# Patient Record
Sex: FEMALE | Race: WHITE | NOT HISPANIC OR LATINO | URBAN - METROPOLITAN AREA
[De-identification: names, ages, dates, MRNs, and addresses within clinical notes are randomized per-mention and may not be internally consistent; named-entity substitution may affect disease eponyms.]

---

## 2018-08-06 ENCOUNTER — EMERGENCY (EMERGENCY)
Facility: HOSPITAL | Age: 38
LOS: 1 days | Discharge: HOME | End: 2018-08-06
Attending: EMERGENCY MEDICINE
Payer: COMMERCIAL

## 2018-08-06 VITALS
HEART RATE: 80 BPM | OXYGEN SATURATION: 98 % | DIASTOLIC BLOOD PRESSURE: 79 MMHG | HEIGHT: 64 IN | TEMPERATURE: 99 F | WEIGHT: 130.07 LBS | SYSTOLIC BLOOD PRESSURE: 127 MMHG | RESPIRATION RATE: 16 BRPM

## 2018-08-06 VITALS
OXYGEN SATURATION: 98 % | DIASTOLIC BLOOD PRESSURE: 74 MMHG | HEART RATE: 80 BPM | SYSTOLIC BLOOD PRESSURE: 103 MMHG | RESPIRATION RATE: 18 BRPM | TEMPERATURE: 98 F

## 2018-08-06 PROCEDURE — 99283 EMERGENCY DEPT VISIT LOW MDM: CPT | Mod: 25

## 2018-08-06 PROCEDURE — 99282 EMERGENCY DEPT VISIT SF MDM: CPT

## 2018-08-06 PROCEDURE — 99053 MED SERV 10PM-8AM 24 HR FAC: CPT

## 2018-08-06 RX ORDER — DIPHENHYDRAMINE HCL 50 MG
25 CAPSULE ORAL ONCE
Qty: 0 | Refills: 0 | Status: COMPLETED | OUTPATIENT
Start: 2018-08-06 | End: 2018-08-06

## 2018-08-06 RX ADMIN — Medication 25 MILLIGRAM(S): at 07:44

## 2018-08-06 NOTE — ED PROVIDER NOTE - MEDICAL DECISION MAKING DETAILS
36yo M->F transgender pt with hx anxiety presents after a panic attack. In ED able to nap. Given benadryl, will dc to f/u with outpatient psychiatry.

## 2018-08-06 NOTE — ED PROVIDER NOTE - OBJECTIVE STATEMENT
36yo M->F transgender pt with hx anxiety presents after a panic attack. reports last night unable to sleep, having persistent symptoms of intrusive thoughts-thinking she has cancer or multiple sclerosis though she knows it is not true. Reports she was filled with panic and unable to fall asleep, feeling tingling all over her body. This AM she felt very upset and mother decided to bring her to ED for evaluation. Denies SI/HI, hallucinations. Reports she stopped staking her estrogen replacement medications 2 weeks ago and has noted her anxiety has worsened since then. Reports she sees a psychiatrist, currently on lexapro 10mg daily for anxiety, next appointment in 4 days. Currently feels better and more calm.

## 2018-08-06 NOTE — ED ADULT NURSE NOTE - NSIMPLEMENTINTERV_GEN_ALL_ED
Implemented All Universal Safety Interventions:  [place] to call system. Call bell, personal items and telephone within reach. Instruct patient to call for assistance. Room bathroom lighting operational. Non-slip footwear when patient is off stretcher. Physically safe environment: no spills, clutter or unnecessary equipment. Stretcher in lowest position, wheels locked, appropriate side rails in place.

## 2020-10-11 ENCOUNTER — EMERGENCY (EMERGENCY)
Facility: HOSPITAL | Age: 40
LOS: 1 days | Discharge: HOME | End: 2020-10-11
Attending: EMERGENCY MEDICINE
Payer: COMMERCIAL

## 2020-10-11 VITALS
RESPIRATION RATE: 16 BRPM | DIASTOLIC BLOOD PRESSURE: 69 MMHG | TEMPERATURE: 98 F | SYSTOLIC BLOOD PRESSURE: 106 MMHG | HEIGHT: 64 IN | HEART RATE: 70 BPM | OXYGEN SATURATION: 97 % | WEIGHT: 128.09 LBS

## 2020-10-11 PROCEDURE — 99283 EMERGENCY DEPT VISIT LOW MDM: CPT

## 2020-10-11 RX ORDER — LIDOCAINE 4 G/100G
1 CREAM TOPICAL
Qty: 1 | Refills: 0
Start: 2020-10-11

## 2020-10-11 RX ORDER — IBUPROFEN 200 MG
1 TABLET ORAL
Qty: 30 | Refills: 0
Start: 2020-10-11

## 2020-10-11 RX ORDER — LIDOCAINE 4 G/100G
1 CREAM TOPICAL ONCE
Refills: 0 | Status: DISCONTINUED | OUTPATIENT
Start: 2020-10-11 | End: 2020-10-15

## 2020-10-11 NOTE — ED PROVIDER NOTE - RECTAL
no external hemorrhoids, no erythema, internal: mild anterior rectal wall tenderness, no fluctuance, no swelling, no erythema, no bleeding, no visible anal fissure/TENDER

## 2020-10-11 NOTE — ED PROVIDER NOTE - PATIENT PORTAL LINK FT
You can access the FollowMyHealth Patient Portal offered by [facility] by registering at the following website: [email or web address] By joining [facility]’s FollowMyHealth portal, you will also be able to view your health information using other applications (apps) compatible with our system.

## 2020-10-11 NOTE — ED ADULT NURSE NOTE - OBJECTIVE STATEMENT
pt is here c/o rectal pain , for the last 4 days , pain on defecating , is concerned for possible object being - prefers to be addressed as a female

## 2020-10-11 NOTE — ED PROVIDER NOTE - OBJECTIVE STATEMENT
38 y/o transgender M to F presents to the ED with complaints of anal pain x 4 days. Patient started having anal receptive intercourse 1 week ago for the first time ever and had her partner using dildos before telling him to stop. 4 days ago patient had anal receptive intercourse with another partner with no issues; penis and condom used. Patient then reports having anal pain for the past 4 days. Patient notes passing normal bowel movements, brown and soft, however noticed blood on toilet paper while wiping and became concerned. Patient went to urgent care today and had an X-ray to look for foreign bodies and a rectal exam. They thought they felt a hemorrhoid on the rectal exam and sent her to the ER for evaluation. Denies fever, chills, abdominal pain or any other acute complaints. 38 y/o transgender M to F presents to the ED with complaints of anal pain x 4 days. Patient started having anal receptive intercourse 1 week ago for the first time ever and had her partner using dildos before telling him to stop. 4 days ago patient had anal receptive intercourse again (no toys) with another partner with no issues; penis and condom used. Patient then reports having anal pain for the past 4 days. Patient notes passing normal bowel movements, brown and soft, however noticed blood on toilet paper while wiping and became concerned. Patient went to urgent care today and had an X-ray to look for foreign bodies and a rectal exam. They thought they felt a hemorrhoid on the rectal exam and sent her to the ER for evaluation. Denies fever, chills, abdominal pain or any other acute complaints.

## 2020-10-11 NOTE — ED PROVIDER NOTE - NSFOLLOWUPINSTRUCTIONS_ED_ALL_ED_FT
Proctalgia Fugax       Proctalgia fugax is a condition that involves short episodes of intense pain in the rectum. The rectum is the last part of the large intestine. The pain can last from seconds to minutes. Episodes often occur during the night and wake the person from sleep. This condition is not a sign of cancer, but your health care provider may want to rule out other conditions.      What are the causes?    The exact cause of this condition is not known. One possible cause may be spasm of the pelvic muscles or spasms of the lowest part of the large intestine.      What are the signs or symptoms?  The only symptom of this condition is rectal pain. The pain may:  •Be intense or severe.      •Last for only a few seconds, or it may last up to 30 minutes.      •Occur at night and wake you up from sleep.        How is this diagnosed?  This condition may be diagnosed based on your medical history, a physical exam, and by ruling out other problems that could cause the pain. You may have various tests, such as:  •Anoscopy. In this test, a lighted scope is put into the rectum to look for abnormalities.      •Barium enema. In this test, X-rays are taken after a white, chalky substance called barium is put into the colon. The barium shows up well on the X-rays and makes it easier to see any problems.      •Blood tests to rule out infections or other problems.        How is this treated?  There is no specific treatment for this condition. This condition may be managed with:  •Medicines.      •Warm baths.      •Relaxation techniques, such as deep breathing exercises or gentle yoga.      •Gentle massage of the painful area.      •Biofeedback.        Follow these instructions at home:     •Take over-the-counter and prescription medicines only as told by your health care provider.      •Follow instructions from your health care provider about eating or drinking restrictions.      •Ask your health care provider what activities are safe for you.      •Try warm baths, massaging the area, or progressive relaxation techniques as told by your health care provider.      •Keep all follow-up visits as told by your health care provider. This is important.        Contact a health care provider if:    •Your pain gets worse.      •You develop new symptoms.      •You have anal pain along with a fever.        Get help right away if you:    •Have blood in your stool or blood coming from the rectal area.        Summary    •Proctalgia fugax is a condition that involves short episodes of intense pain in the rectum. Episodes often occur during the night and wake the person from sleep.      •The cause of this condition is not known.      •Medicines, warm baths, massage, relaxation techniques, and biofeedback may help to manage the pain.      •Get help right away if you have blood in your stool or blood coming from the rectal area.      This information is not intended to replace advice given to you by your health care provider. Make sure you discuss any questions you have with your health care provider.

## 2020-10-11 NOTE — ED PROVIDER NOTE - CLINICAL SUMMARY MEDICAL DECISION MAKING FREE TEXT BOX
40 y/o F presents with anal pain after first time of anal intercourse. X-ray from urgent care unremarkable. Prescribed lidocaine and ibuprofen. Explained to patient that pain is likely associated with anal penetration and is not unusual to have pain. Instructed patient to initiate anal intercourse in a gradual manner and advised her to use a flared base when using anal toys to avoid having them get sucked up. 38 y/o F presents with anal pain after initiating practice first time of anal intercourse. X-ray from urgent care unremarkable. Prescribed lidocaine and ibuprofen. Explained to patient that pain is likely associated with anal penetration and is not unusual to have pain. Instructed patient to initiate anal intercourse in a gradual manner and advised her to use a flared base when using anal toys to avoid having them get sucked up.

## 2022-10-02 NOTE — ED PROVIDER NOTE - TEMPLATE
SLP Contact Note    Chart reviewed for evaluation. Noted patient remains intubated, no command following. Therefore patient not appropriate for SLP evaluation. Will hold and follow up as appropriate.     Thank you,   Sony Iyer M.S. [facility] Patricia Pathologist Abdominal Pain, N/V/D

## 2024-02-13 NOTE — ED ADULT TRIAGE NOTE - BSA (M2)
General New:    History of Present Illness:      Ms. Nohemi Rosas is a 44year old female who presents today s/p  excision of forehead and cheek lesions 2023. Last seen 2023. Had a spitting Vicryl suture that was removed. Has not had any further sutures come out. No pain or drainage. Applying scar gel. REVIEW OF SYSTEMS:   Per HPI, remaining 10 point review of systems negative    Past Medical History:  Past Medical History:   Diagnosis Date    Mild dysplasia of cervix (ZACHARY I)     Psoriasis         Past Surgical History:   Past Surgical History:   Procedure Laterality Date    Conization cervix,loop electrd      Mole removal  2023    benign-removed form forhead       ALLERGIES:  No Known Allergies     Medications:   Current Outpatient Medications   Medication Sig Dispense Refill    betamethasone dipropionate augmented (DIPROLENE AF) 0.05 % cream Apply 2 times daily for 2 weeks; stop for 2 weeks and repeat 2 weeks on/2 weeks off as needed 30 g 11    aDAlimumab (Humira Pen) 40 MG/0.8ML pen-injector kit Inject the contents of 1 pen under the skin every other week 6 each 11    Vitamin D, Ergocalciferol, 2000 units Cap Take 2,000 Units by mouth daily. Anything but tablet, chew/capsule/drop 30 capsule 12     No current facility-administered medications for this visit.         Social History:   Social History     Socioeconomic History    Marital status: /Civil Union     Spouse name: Malini Junior    Number of children: 2    Years of education: 15    Highest education level: Associate degree: academic program   Occupational History    Occupation: homemaker   Tobacco Use    Smoking status: Former     Current packs/day: 0.00     Types: Cigarettes     Quit date:      Years since quittin.1    Smokeless tobacco: Never   Vaping Use    Vaping Use: never used   Substance and Sexual Activity    Alcohol use: Yes     Comment: 1/1-3 mo    Drug use: Never    Sexual activity: Yes     Partners: Male Birth Wade Farley MD 1.62